# Patient Record
Sex: FEMALE
[De-identification: names, ages, dates, MRNs, and addresses within clinical notes are randomized per-mention and may not be internally consistent; named-entity substitution may affect disease eponyms.]

---

## 2024-06-04 ENCOUNTER — APPOINTMENT (OUTPATIENT)
Dept: NEUROLOGY | Facility: CLINIC | Age: 39
End: 2024-06-04
Payer: COMMERCIAL

## 2024-06-04 ENCOUNTER — TRANSCRIPTION ENCOUNTER (OUTPATIENT)
Age: 39
End: 2024-06-04

## 2024-06-04 VITALS
SYSTOLIC BLOOD PRESSURE: 109 MMHG | HEIGHT: 67 IN | BODY MASS INDEX: 25.9 KG/M2 | DIASTOLIC BLOOD PRESSURE: 74 MMHG | HEART RATE: 75 BPM | OXYGEN SATURATION: 100 % | WEIGHT: 165 LBS

## 2024-06-04 DIAGNOSIS — D68.51 ACTIVATED PROTEIN C RESISTANCE: ICD-10-CM

## 2024-06-04 DIAGNOSIS — R59.0 LOCALIZED ENLARGED LYMPH NODES: ICD-10-CM

## 2024-06-04 DIAGNOSIS — R51.0 HEADACHE WITH ORTHOSTATIC COMPONENT, NOT ELSEWHERE CLASSIFIED: ICD-10-CM

## 2024-06-04 DIAGNOSIS — G43.109 MIGRAINE WITH AURA, NOT INTRACTABLE, W/OUT STATUS MIGRAINOSUS: ICD-10-CM

## 2024-06-04 DIAGNOSIS — G44.209 TENSION-TYPE HEADACHE, UNSPECIFIED, NOT INTRACTABLE: ICD-10-CM

## 2024-06-04 DIAGNOSIS — Z86.718 PERSONAL HISTORY OF OTHER VENOUS THROMBOSIS AND EMBOLISM: ICD-10-CM

## 2024-06-04 PROBLEM — Z00.00 ENCOUNTER FOR PREVENTIVE HEALTH EXAMINATION: Status: ACTIVE | Noted: 2024-06-04

## 2024-06-04 PROCEDURE — 99205 OFFICE O/P NEW HI 60 MIN: CPT

## 2024-06-04 RX ORDER — SPIRONOLACTONE 50 MG/1
TABLET ORAL
Refills: 0 | Status: ACTIVE | COMMUNITY

## 2024-06-04 RX ORDER — KRILL/OM-3/DHA/EPA/PHOSPHO/AST 1000-230MG
CAPSULE ORAL
Refills: 0 | Status: ACTIVE | COMMUNITY

## 2024-06-04 NOTE — PHYSICAL EXAM
[FreeTextEntry1] : GENERAL PHYSICAL EXAM: GEN: no distress, normal affect EYES: sclera white, conjunctiva clear, no nystagmus SKIN: warm, dry, no rash or lesion on exposed skin   NEUROLOGICAL EXAM: Mental Status Orientation: alert and oriented to person, place, time, and situation Language: clear and fluent, intact comprehension and repetition   Cranial Nerves II: visual fields full to confrontation III, IV, VI: PERRL, EOMI V, VII: facial sensation and movement intact and symmetric VIII: hearing intact IX, X: uvula midline, soft palate elevates normally XI: BL shoulder shrug intact XII: tongue midline   Motor Bilateral muscle strength 5/5 in UE and LE, proximally and distally Tone and bulk are normal in upper and lower limbs   Sensation Intact to light touch in all 4 EXTs  Coordination Normal FTN bilaterally   Gait Normal stance, stride, and pivot turn Tandem walk intact Negative Romberg.

## 2024-06-04 NOTE — ASSESSMENT
[FreeTextEntry1] : Ju Sanchez is a 38YO female, presenting today for evaluation of new onset of headaches.  Patient has a history of ocular migraines that she normally does not treat with any medications, sometimes Excedrin.  She reports these new headaches are different, she reports the onset began last week with 2 days of a severe icepick headache in the right temporal region that resolved after 2 days and developed into an ongoing pressure in the head that is worse with position changes.  Her description of these ongoing headaches are consistent with tension headaches however given that they are affected by position changes would like to get additional imaging to rule out any underlying insidious causes.  Patient does have a known risk for blood clots due to factor V Leiden.  Neurological exam is unremarkable at this time.   Plan: - Labs to assess for underlying reversible causes for ocular migraines and new onset of headaches. -MRI of the head to assess for underlying etiology to explain new onset of new headache type. - CT of the soft tissues of the neck to assess for new onset of an enlarged lymph node on the right side of the neck. 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  But will assess with the labs and MRI if there are any other underlying causes for this. -Physical therapy referral for chronic neck tension and new onset of tension type headaches.  Patient also encouraged to trial getting a professional massage to alleviate neck spasm found on exam. -Follow-up in 6 weeks or sooner should the need arise.  Will call the patient with all results.

## 2024-06-04 NOTE — HISTORY OF PRESENT ILLNESS
[FreeTextEntry1] :  Ms. MERI MANRIQUEZ is a 39-year-old female with no PMH who presents today with complaint of headaches. She had ocular migraines over the last few months. Severe headache was Friday into Saturday, stabbing, like an ice pick in the head. After turned into pressure with movement. Denies any recent head injury or trauma.  Pain free time is only a few hours if she takes an OTC medication.  Associated symptoms include: nausea. Denies photophobia, phonophobia.  Pt has tried taking acetaminophen and Excedrin, which relieves the symptoms temporarily but will come back with wear off.  Memory has been worse over the last 2 month.  Pt has no further neurological complaints.   Severity: 8/10, daily headaches are more 4-5/10.  Disability: changing positions causes a rush to the head of pressure and pain.  Location: feels like the whole head, mostly across the front and temporal.  Quality: pressure Aura: none, other than with ocular migraines Triggers: none that she is aware of.  Preventative meds: none Abortive meds: none  No history of cardiac arrythmia, palpitations, asthma. Hx of 2 blood clots in RLE.   No prior imaging of head or neck. Has factor V clotting disorder.   MIDAS: 15

## 2024-07-16 ENCOUNTER — APPOINTMENT (OUTPATIENT)
Dept: NEUROLOGY | Facility: CLINIC | Age: 39
End: 2024-07-16

## 2024-07-16 VITALS
BODY MASS INDEX: 25.9 KG/M2 | HEART RATE: 90 BPM | OXYGEN SATURATION: 100 % | HEIGHT: 67 IN | SYSTOLIC BLOOD PRESSURE: 105 MMHG | WEIGHT: 165 LBS | DIASTOLIC BLOOD PRESSURE: 74 MMHG

## 2024-07-16 DIAGNOSIS — G43.109 MIGRAINE WITH AURA, NOT INTRACTABLE, W/OUT STATUS MIGRAINOSUS: ICD-10-CM

## 2024-07-16 DIAGNOSIS — M62.838 OTHER MUSCLE SPASM: ICD-10-CM

## 2024-07-16 DIAGNOSIS — G44.209 TENSION-TYPE HEADACHE, UNSPECIFIED, NOT INTRACTABLE: ICD-10-CM

## 2024-07-16 PROCEDURE — 99214 OFFICE O/P EST MOD 30 MIN: CPT

## 2024-07-16 PROCEDURE — G2211 COMPLEX E/M VISIT ADD ON: CPT

## 2024-07-17 RX ORDER — UBROGEPANT 50 MG/1
50 TABLET ORAL
Qty: 10 | Refills: 5 | Status: ACTIVE | COMMUNITY
Start: 2024-07-16 | End: 1900-01-01